# Patient Record
Sex: FEMALE | Race: WHITE | NOT HISPANIC OR LATINO | Employment: UNEMPLOYED | URBAN - METROPOLITAN AREA
[De-identification: names, ages, dates, MRNs, and addresses within clinical notes are randomized per-mention and may not be internally consistent; named-entity substitution may affect disease eponyms.]

---

## 2024-01-29 ENCOUNTER — EVALUATION (OUTPATIENT)
Dept: OCCUPATIONAL THERAPY | Facility: HOSPITAL | Age: 54
End: 2024-01-29
Payer: COMMERCIAL

## 2024-01-29 DIAGNOSIS — I89.0 LYMPHEDEMA, NOT ELSEWHERE CLASSIFIED: Primary | ICD-10-CM

## 2024-01-29 PROCEDURE — 97165 OT EVAL LOW COMPLEX 30 MIN: CPT | Mod: GO | Performed by: OCCUPATIONAL THERAPIST

## 2024-01-29 PROCEDURE — 97535 SELF CARE MNGMENT TRAINING: CPT | Mod: GO | Performed by: OCCUPATIONAL THERAPIST

## 2024-01-29 ASSESSMENT — PATIENT HEALTH QUESTIONNAIRE - PHQ9
8. MOVING OR SPEAKING SO SLOWLY THAT OTHER PEOPLE COULD HAVE NOTICED. OR THE OPPOSITE, BEING SO FIGETY OR RESTLESS THAT YOU HAVE BEEN MOVING AROUND A LOT MORE THAN USUAL: MORE THAN HALF THE DAYS
4. FEELING TIRED OR HAVING LITTLE ENERGY: MORE THAN HALF THE DAYS
10. IF YOU CHECKED OFF ANY PROBLEMS, HOW DIFFICULT HAVE THESE PROBLEMS MADE IT FOR YOU TO DO YOUR WORK, TAKE CARE OF THINGS AT HOME, OR GET ALONG WITH OTHER PEOPLE: SOMEWHAT DIFFICULT
9. THOUGHTS THAT YOU WOULD BE BETTER OFF DEAD, OR OF HURTING YOURSELF: NOT AT ALL
SUM OF ALL RESPONSES TO PHQ QUESTIONS 1-9: 15
SUM OF ALL RESPONSES TO PHQ9 QUESTIONS 1 AND 2: 3
2. FEELING DOWN, DEPRESSED OR HOPELESS: SEVERAL DAYS
5. POOR APPETITE OR OVEREATING: NEARLY EVERY DAY
3. TROUBLE FALLING OR STAYING ASLEEP OR SLEEPING TOO MUCH: MORE THAN HALF THE DAYS
7. TROUBLE CONCENTRATING ON THINGS, SUCH AS READING THE NEWSPAPER OR WATCHING TELEVISION: SEVERAL DAYS
1. LITTLE INTEREST OR PLEASURE IN DOING THINGS: MORE THAN HALF THE DAYS
6. FEELING BAD ABOUT YOURSELF - OR THAT YOU ARE A FAILURE OR HAVE LET YOURSELF OR YOUR FAMILY DOWN: MORE THAN HALF THE DAYS

## 2024-01-29 ASSESSMENT — ENCOUNTER SYMPTOMS
LOSS OF SENSATION IN FEET: 0
DEPRESSION: 1
OCCASIONAL FEELINGS OF UNSTEADINESS: 1

## 2024-01-29 ASSESSMENT — ACTIVITIES OF DAILY LIVING (ADL): HOME_MANAGEMENT_TIME_ENTRY: 20

## 2024-01-29 ASSESSMENT — PAIN - FUNCTIONAL ASSESSMENT: PAIN_FUNCTIONAL_ASSESSMENT: 0-10

## 2024-01-29 ASSESSMENT — PAIN SCALES - GENERAL: PAINLEVEL_OUTOF10: 5 - MODERATE PAIN

## 2024-01-29 NOTE — Clinical Note
January 29, 2024    Jennie Stack DO  [address]   [facility] [place] [address]    Patient: Kamilah Menjivar   YOB: 1970   Date of Visit: 1/29/2024       Dear Jennie Stack DO  [address]   [facility] [place] [address]    The attached plan of care is being sent to you because your patient’s medical reimbursement requires that you certify the plan of care. Your signature is required to allow uninterrupted insurance coverage.      You may indicate your approval by signing below and faxing this form back to us at Dept Fax: [phone].    Please call Dept: [phone] with any questions or concerns.    Thank you for this referral,        Bella Hines OT  [address] [id number] [phone]    Payer: Payor: [employer] / Plan: [facility] HEALTH PLAN / Product Type: *No Product type* /                                                                         Date:     Dear Bella Hines OT,     Re: Ms. Kamilah Menjivar, MRN:[id number]    I certify that I have reviewed the attached plan of care and it is medically necessary for Ms. Kamilah Menjivar (1970) who is under my care.          ______________________________________                    _________________  Provider name and credentials                                           Date and time                                                                                           Plan of Care 1/29/24   Effective from: 1/29/2024  Effective to: 4/29/2024    Plan ID: [id number]            Participants as of Finalize on 1/29/2024    Name Type Comments Contact Info    Jennie Stack DO PCP - General  [phone]    Bella Hines OT Occupational Therapist  [phone]       Last Plan Note     Author: Bella Hines OT Status: Signed Last edited: 1/29/2024  2:00 PM       Occupational  Therapy    Evaluation/Treatment    Patient Name: Kamilah Menjivar  MRN: [id number]  : 1970  Today's Date: 24  Visit number: 1      Time Calculation  Start Time: 1400  Stop Time: 1500  Time Calculation (min): 60 min    Assessment:   Pt has secondary lymphedema in VICTOR M LE , stage 2, phase 1 with moderate swelling.   Recommend inelastic wraps.  Pt has a pneumatic compression pump.   Plan:     Frequency: 1 x/wk  Duration: 12 weeks  Rehab Potential: Good  Plan of Care Agreement: Patient  Pt was provided with education on the lymphatic system, lymphedema and what treatment will include.  Education today touched on compression, exercises, skin care and MLD.  Pt is to elevate as able and increase activity, especially walking.  Compression was initiated.  Pt was fitted with double size G tg .  Pt was instructed on use, care, application and precautions of tg .     Pt has been in lymphedema care at a different location a couple years ago,   Treatment may include manual, self care, exercise, activity and aquatics.     Subjective   Current Problem:  1. Lymphedema, not elsewhere classified  Referral to Occupational Therapy    Follow Up In Occupational Therapy        General:   OT Received On: 24  General  Reason for Referral: LE lymphedema  Referred By: Jennie Stack    Pt states she has had swelling in her legs for a couple years.  She has had open wounds in the past.  States she has been treated in the past for lymphedema.  States she has a pneumatic compression pump at home.  She tries to use it everyday.  States she has compression knee highs but is not wearing today.  Pt feels the swelling has increased.  She live in a house with her elderly parents. States they are not able to help her.  She is sleeping in a bed. She states her legs are weak and she has decreased balance.   Precautions:   None  Pain:  Pain Assessment  Pain Assessment: 0-10  Pain Score: 5 - Moderate pain  Pain Location:  Knee  Pain Orientation: Right, Left      Objective   Prior Function: independent            Lymphedema Assessment     Right Lower Extremity:  R Metatarsal (cm): 21.5 cm  R Heel Y Angle: 29.5 cm  R Ankle (cm): 26.5 cm  R 10 cm Above Ankle (cm): 26 cm  R 20 cm Above Ankle (cm): 36 cm  R 30 cm Above Ankle (cm): 55 cm  R 40 cm Above Ankle (cm): 0 cm  R Knee (cm): 0 cm  R 10 cm Above Knee (cm): 0 cm  R 20 cm Above Knee (cm): 0 cm  R 30 cm Above Knee (cm): 0 cm  R 40 cm Above Knee (cm): 0 cm  R 50 cm Above Knee (cm): 0 cm  Right lower extremity total: 194.5  Left Lower Extremity:  L Metatarsal (cm): 20.5 cm  L Heel Y Angle: 29.5 cm  L Ankle (cm): 28 cm  L 10 cm Above Ankle (cm): 30.5 cm  L 20 cm Above Ankle (cm): 45 cm  L 30 cm Above Ankle (cm): 54 cm  L 40 cm Above Ankle (cm): 0 cm  L Knee (cm): 0 cm  L 10 cm Above Knee (cm): 0 cm  L 20 cm Above Knee (cm): 0 cm  L 30 cm Above Knee (cm): 0 cm  L 40 cm Above Knee (cm): 0 cm  L 50 cm Above Knee (cm): 0 cm  Left Lower extremity total: 207.5  LE Skin Appearance/Condition and Girth:  Dryness: yes  Edema - Pitting: yes  Hyperpigmentation: yes    Pt needed hand held assist to stand from the mat.  Dynamic standing balance is fair. Close supervision.      Pain Assessment:  Pain Assessment: 0-10  Pain Score: 5 - Moderate pain  Pain Location: Knee  Pain Orientation: Right, Left  Outcome Measures: LLIS score of 51 which is 75% impaired.   Fall risk, close supervision.  Pt states she is receiving mental health care.     OP EDUCATION:  Education  Individual(s) Educated: Patient  Education Provided: Diagnosis & Precautions, Edema control, Lymphedema, Symptom management, Risk and benefits of OT discussed with patient or other, POC discussed and agreed upon  Home Program: Edema control, Handout issued  Equipment: Tubigrip  Risk and Benefits Discussed with Patient/Caregiver/Other: yes  Patient/Caregiver Demonstrated Understanding: yes  Plan of Care Discussed and Agreed Upon: yes  Patient  Response to Education: Patient/Caregiver Verbalized Understanding of Information    Goals:  Active       OT Goals       OT Goal 1       Start:  01/29/24    Expected End:  04/29/24       STG: Pt will be independent with self management, including use of garments, pump if indicated, self MLD, ability to recognize signs of infection/cellulitis and exercise/HEP to minimize risk of progression of symptoms and skin infections/cellulitis.   LTG: Pt will show improvement on the LLIS impairment scale to no greater than 20% impaired.    STG: Decrease girth in bilateral LEs by 15 cm each for improved mobility and better clothing/shoe fit.    LTG: Increase VICTOR M leg strength to independent for sit to stand and good dynamic balance.             OT Problem       PATIENT STATED GOAL to decrease size of legs.        Start:  01/29/24    Expected End:  04/29/24       Pt goal is to decrease size of legs, increase balance and overall strength.                   Current Participants as of 1/29/2024    Name Type Comments Contact Info    Jennie Stack DO PCP - General  [phone]    Signature pending    Bella Hines OT Occupational Therapist  [phone]    Signature pending

## 2024-01-29 NOTE — PROGRESS NOTES
Occupational Therapy    Evaluation/Treatment    Patient Name: Kamilah Menjivar  MRN: [id number]  : 1970  Today's Date: 24  Visit number: 1      Time Calculation  Start Time: 1400  Stop Time: 1500  Time Calculation (min): 60 min    Assessment:   Pt has secondary lymphedema in VICTOR M LE , stage 2, phase 1 with moderate swelling.   Recommend inelastic wraps.  Pt has a pneumatic compression pump.   Plan:     Frequency: 1 x/wk  Duration: 12 weeks  Rehab Potential: Good  Plan of Care Agreement: Patient  Pt was provided with education on the lymphatic system, lymphedema and what treatment will include.  Education today touched on compression, exercises, skin care and MLD.  Pt is to elevate as able and increase activity, especially walking.  Compression was initiated.  Pt was fitted with double size G tg .  Pt was instructed on use, care, application and precautions of tg .     Pt has been in lymphedema care at a different location a couple years ago,   Treatment may include manual, self care, exercise, activity and aquatics.     Subjective   Current Problem:  1. Lymphedema, not elsewhere classified  Referral to Occupational Therapy    Follow Up In Occupational Therapy        General:   OT Received On: 24  General  Reason for Referral: LE lymphedema  Referred By: Jennie Stack    Pt states she has had swelling in her legs for a couple years.  She has had open wounds in the past.  States she has been treated in the past for lymphedema.  States she has a pneumatic compression pump at home.  She tries to use it everyday.  States she has compression knee highs but is not wearing today.  Pt feels the swelling has increased.  She live in a house with her elderly parents. States they are not able to help her.  She is sleeping in a bed. She states her legs are weak and she has decreased balance.   Precautions:   None  Pain:  Pain Assessment  Pain Assessment: 0-10  Pain Score: 5 - Moderate pain  Pain  Location: Knee  Pain Orientation: Right, Left      Objective   Prior Function: independent            Lymphedema Assessment     Right Lower Extremity:  R Metatarsal (cm): 21.5 cm  R Heel Y Angle: 29.5 cm  R Ankle (cm): 26.5 cm  R 10 cm Above Ankle (cm): 26 cm  R 20 cm Above Ankle (cm): 36 cm  R 30 cm Above Ankle (cm): 55 cm  R 40 cm Above Ankle (cm): 0 cm  R Knee (cm): 0 cm  R 10 cm Above Knee (cm): 0 cm  R 20 cm Above Knee (cm): 0 cm  R 30 cm Above Knee (cm): 0 cm  R 40 cm Above Knee (cm): 0 cm  R 50 cm Above Knee (cm): 0 cm  Right lower extremity total: 194.5  Left Lower Extremity:  L Metatarsal (cm): 20.5 cm  L Heel Y Angle: 29.5 cm  L Ankle (cm): 28 cm  L 10 cm Above Ankle (cm): 30.5 cm  L 20 cm Above Ankle (cm): 45 cm  L 30 cm Above Ankle (cm): 54 cm  L 40 cm Above Ankle (cm): 0 cm  L Knee (cm): 0 cm  L 10 cm Above Knee (cm): 0 cm  L 20 cm Above Knee (cm): 0 cm  L 30 cm Above Knee (cm): 0 cm  L 40 cm Above Knee (cm): 0 cm  L 50 cm Above Knee (cm): 0 cm  Left Lower extremity total: 207.5  LE Skin Appearance/Condition and Girth:  Dryness: yes  Edema - Pitting: yes  Hyperpigmentation: yes    Pt needed hand held assist to stand from the mat.  Dynamic standing balance is fair. Close supervision.      Pain Assessment:  Pain Assessment: 0-10  Pain Score: 5 - Moderate pain  Pain Location: Knee  Pain Orientation: Right, Left  Outcome Measures: LLIS score of 51 which is 75% impaired.   Fall risk, close supervision.  Pt states she is receiving mental health care.     OP EDUCATION:  Education  Individual(s) Educated: Patient  Education Provided: Diagnosis & Precautions, Edema control, Lymphedema, Symptom management, Risk and benefits of OT discussed with patient or other, POC discussed and agreed upon  Home Program: Edema control, Handout issued  Equipment: Tubigrip  Risk and Benefits Discussed with Patient/Caregiver/Other: yes  Patient/Caregiver Demonstrated Understanding: yes  Plan of Care Discussed and Agreed Upon:  yes  Patient Response to Education: Patient/Caregiver Verbalized Understanding of Information    Goals:  Active       OT Goals       OT Goal 1       Start:  01/29/24    Expected End:  04/29/24       STG: Pt will be independent with self management, including use of garments, pump if indicated, self MLD, ability to recognize signs of infection/cellulitis and exercise/HEP to minimize risk of progression of symptoms and skin infections/cellulitis.   LTG: Pt will show improvement on the LLIS impairment scale to no greater than 20% impaired.    STG: Decrease girth in bilateral LEs by 15 cm each for improved mobility and better clothing/shoe fit.    LTG: Increase VICTOR M leg strength to independent for sit to stand and good dynamic balance.             OT Problem       PATIENT STATED GOAL to decrease size of legs.        Start:  01/29/24    Expected End:  04/29/24       Pt goal is to decrease size of legs, increase balance and overall strength.

## 2024-02-07 ENCOUNTER — TREATMENT (OUTPATIENT)
Dept: OCCUPATIONAL THERAPY | Facility: HOSPITAL | Age: 54
End: 2024-02-07
Payer: COMMERCIAL

## 2024-02-07 DIAGNOSIS — I89.0 LYMPHEDEMA, NOT ELSEWHERE CLASSIFIED: ICD-10-CM

## 2024-02-07 PROCEDURE — 97535 SELF CARE MNGMENT TRAINING: CPT | Mod: GO | Performed by: OCCUPATIONAL THERAPIST

## 2024-02-07 ASSESSMENT — ACTIVITIES OF DAILY LIVING (ADL): HOME_MANAGEMENT_TIME_ENTRY: 54

## 2024-02-07 NOTE — PROGRESS NOTES
Occupational Therapy    Occupational Therapy Treatment    Name: Kamilah Menjivar  MRN: [id number]  : 1970  Date: 24  Time Calculation  Start Time: 820  Stop Time: 914  Time Calculation (min): 54 min  Visit number: 2  Pt was 20 minutes late.   Assessment:  Left decreased in girth, right increased.  Recommend inelastic wraps due to patient being in phase one of treatment, stage 2 with moderate swelling.   After trial with the inelastic wrap girth in the left leg decreased 4 cm.   Plan:  Check benefits for coverage for garments.       Subjective   General: Pt brought in custom knee high compression socks.  She states difficulty donning them.         Objective    Therapy/Activity:   SELF CARE: 54 min.  Educated pt on inelastic wraps vs compression knee highs.  Recommending wraps secondary to being in phase one of treatment.  Demonstrated a Sigvaris compreflex on pt.  Showed pt how to use a metal donning aide to don compression knee highs.  Pt has difficulty reaching.  Both compression knee highs were donned.  Discussed wearing these as able and wearing into treatment next time.  Pt stated after 10 minutes that they were not comfortable and wanted them off.  Socks were removed. Gave pt info on purchasing an inelastic wrap.  Pt states she would think about it.  Discussed for pt to purchase compression shorts.      Lymphedema Assessment       Right Lower Extremity:  R Metatarsal (cm): 21 cm  R Heel Y Angle: 28 cm  R Ankle (cm): 25 cm  R 10 cm Above Ankle (cm): 32.5 cm  R 20 cm Above Ankle (cm): 43.5 cm  R 30 cm Above Ankle (cm): 50 cm  R 40 cm Above Ankle (cm): 0 cm  R Knee (cm): 0 cm  R 10 cm Above Knee (cm): 0 cm  R 20 cm Above Knee (cm): 0 cm  R 30 cm Above Knee (cm): 0 cm  R 40 cm Above Knee (cm): 0 cm  R 50 cm Above Knee (cm): 0 cm  Right lower extremity total: 200  Left Lower Extremity:  L Metatarsal (cm): 20 cm  L Heel Y Angle: 28.5 cm  L Ankle (cm): 27 cm  L 10 cm Above Ankle (cm): 32 cm  L 20 cm  Above Ankle (cm): 43.5 cm  L 30 cm Above Ankle (cm): 50 cm  L 40 cm Above Ankle (cm): 0 cm  L Knee (cm): 0 cm  L 10 cm Above Knee (cm): 0 cm  L 20 cm Above Knee (cm): 0 cm  L 30 cm Above Knee (cm): 0 cm  L 40 cm Above Knee (cm): 0 cm  L 50 cm Above Knee (cm): 0 cm  Left Lower extremity total: 201  LE Skin Appearance/Condition and Girth:   Light redness on the right shin.    Pain Assessment:  No number given.  Pt stated discomfort.    Therapy Precautions:   none   OP EDUCATION: inelastic wraps, adaptive equipment       Goals:  Active       OT Goals       OT Goal 1       Start:  01/29/24    Expected End:  04/29/24       STG: Pt will be independent with self management, including use of garments, pump if indicated, self MLD, ability to recognize signs of infection/cellulitis and exercise/HEP to minimize risk of progression of symptoms and skin infections/cellulitis.   LTG: Pt will show improvement on the LLIS impairment scale to no greater than 20% impaired.    STG: Decrease girth in bilateral LEs by 15 cm each for improved mobility and better clothing/shoe fit.    LTG: Increase VICTOR M leg strength to independent for sit to stand and good dynamic balance.             OT Problem       PATIENT STATED GOAL to decrease size of legs.        Start:  01/29/24    Expected End:  04/29/24       Pt goal is to decrease size of legs, increase balance and overall strength.

## 2024-02-14 ENCOUNTER — TREATMENT (OUTPATIENT)
Dept: OCCUPATIONAL THERAPY | Facility: HOSPITAL | Age: 54
End: 2024-02-14
Payer: COMMERCIAL

## 2024-02-14 DIAGNOSIS — I89.0 LYMPHEDEMA, NOT ELSEWHERE CLASSIFIED: ICD-10-CM

## 2024-02-14 PROCEDURE — 97535 SELF CARE MNGMENT TRAINING: CPT | Mod: GO | Performed by: OCCUPATIONAL THERAPIST

## 2024-02-14 PROCEDURE — 97140 MANUAL THERAPY 1/> REGIONS: CPT | Mod: GO | Performed by: OCCUPATIONAL THERAPIST

## 2024-02-14 ASSESSMENT — ACTIVITIES OF DAILY LIVING (ADL): HOME_MANAGEMENT_TIME_ENTRY: 15

## 2024-02-14 NOTE — PROGRESS NOTES
Occupational Therapy    Occupational Therapy Treatment    Name: Kamilah Menjivar  MRN: [id number]  : 1970  Date: 24  Time Calculation  Start Time: 1420  Stop Time: 1500  Time Calculation (min): 40 min  Visit number: 3  Pt 20 min late.    Assessment:  Increase in girth right and left LEs.    Plan:  Pt to be consistent with double layer tg .        Subjective   General: Pt is wearing a single layer of tg .  States she does not know where the double layer given at eval is.          Objective    Therapy/Activity:   SELF CARE: 15 min Pt's insurance most likely covers compression stockings and not inelastic wraps.  This was discussed with the patient. Discussed medical grade compression vs inelastic wraps/medical grade therapeutic compression. Fitted pt with double sized F tg .  Encouraged pt to wear these in next time.  Pt states she would be willing to purchase an inelastic wrap.  Will discuss further next treatment.    Discussed use of the pool.  Pt indicated she has open wounds, unclear where.  Pt cannot be cleared for the pool with any wounds.   MANUAL:  25 min pt was provided with MLD for VICTOR M LEs.  Initiated self MLD.    Lymphedema Assessment       Right Lower Extremity:  R Metatarsal (cm): 21.5 cm  R Heel Y Angle: 28 cm  R Ankle (cm): 26 cm  R 10 cm Above Ankle (cm): 33 cm  R 20 cm Above Ankle (cm): 45 cm  R 30 cm Above Ankle (cm): 51.5 cm  R 40 cm Above Ankle (cm): 0 cm  R Knee (cm): 0 cm  R 10 cm Above Knee (cm): 0 cm  R 20 cm Above Knee (cm): 0 cm  R 30 cm Above Knee (cm): 0 cm  R 40 cm Above Knee (cm): 0 cm  R 50 cm Above Knee (cm): 0 cm  Right lower extremity total: 205  Left Lower Extremity:  L Metatarsal (cm): 20.5 cm  L Heel Y Angle: 30 cm  L Ankle (cm): 27.5 cm  L 10 cm Above Ankle (cm): 31.5 cm  L 20 cm Above Ankle (cm): 44.5 cm  L 30 cm Above Ankle (cm): 50 cm  L 40 cm Above Ankle (cm): 0 cm  L Knee (cm): 0 cm  L 10 cm Above Knee (cm): 0 cm  L 20 cm Above Knee (cm): 0 cm  L 30  cm Above Knee (cm): 0 cm  L 40 cm Above Knee (cm): 0 cm  L 50 cm Above Knee (cm): 0 cm  Left Lower extremity total: 204         Pain Assessment: none today.     Therapy Precautions:   none     OP EDUCATION: compression       Goals:  Active       OT Goals       OT Goal 1       Start:  01/29/24    Expected End:  04/29/24       STG: Pt will be independent with self management, including use of garments, pump if indicated, self MLD, ability to recognize signs of infection/cellulitis and exercise/HEP to minimize risk of progression of symptoms and skin infections/cellulitis.   LTG: Pt will show improvement on the LLIS impairment scale to no greater than 20% impaired.    STG: Decrease girth in bilateral LEs by 15 cm each for improved mobility and better clothing/shoe fit.    LTG: Increase VICTOR M leg strength to independent for sit to stand and good dynamic balance.             OT Problem       PATIENT STATED GOAL to decrease size of legs.        Start:  01/29/24    Expected End:  04/29/24       Pt goal is to decrease size of legs, increase balance and overall strength.

## 2024-02-21 ENCOUNTER — TREATMENT (OUTPATIENT)
Dept: OCCUPATIONAL THERAPY | Facility: HOSPITAL | Age: 54
End: 2024-02-21
Payer: COMMERCIAL

## 2024-02-21 DIAGNOSIS — I89.0 LYMPHEDEMA, NOT ELSEWHERE CLASSIFIED: Primary | ICD-10-CM

## 2024-02-21 PROCEDURE — 97535 SELF CARE MNGMENT TRAINING: CPT | Mod: GO | Performed by: OCCUPATIONAL THERAPIST

## 2024-02-21 PROCEDURE — 97140 MANUAL THERAPY 1/> REGIONS: CPT | Mod: GO | Performed by: OCCUPATIONAL THERAPIST

## 2024-02-21 ASSESSMENT — ACTIVITIES OF DAILY LIVING (ADL): HOME_MANAGEMENT_TIME_ENTRY: 29

## 2024-02-21 NOTE — PROGRESS NOTES
Occupational Therapy    Occupational Therapy Treatment    Name: Kamilah Menjivar  MRN: [id number]  : 1970  Date: 24  Time Calculation  Start Time: 1018  Stop Time: 1107  Time Calculation (min): 49 min  Visit number: 4    Assessment:  Some decrease in both legs.    Plan:  Fit with inelastic wrap when received.        Subjective   General: Difficult to pull size F tg  on upper calf.         Objective    Therapy/Activity:   SELF CARE: Tg  was rolled to mid-calf both legs. Gave pt a pair of size G tg .  Suggested pt wear size F on the lower leg and size G on the upper calf. Gave patient two sets of each.  Pt found the size G more comfortable.  Assisted pt to purchase one inelastic wrap.     Lymphedema Assessment       Right Lower Extremity:  R Metatarsal (cm): 21.5 cm  R Heel Y Angle: 28 cm  R Ankle (cm): 24.5 cm  R 10 cm Above Ankle (cm): 32.5 cm  R 20 cm Above Ankle (cm): 44 cm  R 30 cm Above Ankle (cm): 52 cm  R 40 cm Above Ankle (cm): 0 cm  R Knee (cm): 0 cm  R 10 cm Above Knee (cm): 0 cm  R 20 cm Above Knee (cm): 0 cm  R 30 cm Above Knee (cm): 0 cm  R 40 cm Above Knee (cm): 0 cm  R 50 cm Above Knee (cm): 0 cm  Right lower extremity total: 202.5  Left Lower Extremity:  L Metatarsal (cm): 22 cm  L Heel Y Angle: 29 cm  L Ankle (cm): 26.5 cm  L 10 cm Above Ankle (cm): 31 cm  L 20 cm Above Ankle (cm): 43.5 cm  L 30 cm Above Ankle (cm): 50 cm  L 40 cm Above Ankle (cm): 0 cm  L Knee (cm): 0 cm  L 10 cm Above Knee (cm): 0 cm  L 20 cm Above Knee (cm): 0 cm  L 30 cm Above Knee (cm): 0 cm  L 40 cm Above Knee (cm): 0 cm  L 50 cm Above Knee (cm): 0 cm  Left Lower extremity total: 202  LE Skin Appearance/Condition and Girth:   No issues    Pain Assessment: none reported     Therapy Precautions: none     OP EDUCATION: garments       Goals:  Active       OT Goals       OT Goal 1       Start:  24    Expected End:  24       STG: Pt will be independent with self management, including use of  garments, pump if indicated, self MLD, ability to recognize signs of infection/cellulitis and exercise/HEP to minimize risk of progression of symptoms and skin infections/cellulitis.   LTG: Pt will show improvement on the LLIS impairment scale to no greater than 20% impaired.    STG: Decrease girth in bilateral LEs by 15 cm each for improved mobility and better clothing/shoe fit.    LTG: Increase VICTORM  leg strength to independent for sit to stand and good dynamic balance.             OT Problem       PATIENT STATED GOAL to decrease size of legs.        Start:  01/29/24    Expected End:  04/29/24       Pt goal is to decrease size of legs, increase balance and overall strength.

## 2024-02-28 ENCOUNTER — APPOINTMENT (OUTPATIENT)
Dept: OCCUPATIONAL THERAPY | Facility: HOSPITAL | Age: 54
End: 2024-02-28
Payer: COMMERCIAL

## 2024-03-06 ENCOUNTER — TREATMENT (OUTPATIENT)
Dept: OCCUPATIONAL THERAPY | Facility: HOSPITAL | Age: 54
End: 2024-03-06
Payer: COMMERCIAL

## 2024-03-06 DIAGNOSIS — I89.0 LYMPHEDEMA, NOT ELSEWHERE CLASSIFIED: Primary | ICD-10-CM

## 2024-03-06 PROCEDURE — 97140 MANUAL THERAPY 1/> REGIONS: CPT | Mod: GO | Performed by: OCCUPATIONAL THERAPIST

## 2024-03-06 PROCEDURE — 97535 SELF CARE MNGMENT TRAINING: CPT | Mod: GO | Performed by: OCCUPATIONAL THERAPIST

## 2024-03-06 ASSESSMENT — ACTIVITIES OF DAILY LIVING (ADL): HOME_MANAGEMENT_TIME_ENTRY: 30

## 2024-03-06 NOTE — PROGRESS NOTES
Occupational Therapy    Occupational Therapy Treatment    Name: Kamilah Menjivar  MRN: [id number]  : 1970  Date: 24   Pt arrived 25 min late.                       Visit number: 5    Assessment:   Slight decrease in girth.  Plan:  Continue.         Subjective   General: No new info.        Objective    Therapy/Activity:   SELF CARE: 30 min Discussed importance of consistence use of medical grade therapeutic compression.   MANUAL: 30 min Provided modified manual lymph drainage for VICTOR M LEs.  Reviewed self MLD.     Lymphedema Assessment       Right Lower Extremity:  R Metatarsal (cm): 20.3 cm  R Heel Y Angle: 28 cm  R Ankle (cm): 24.5 cm  R 10 cm Above Ankle (cm): 33 cm  R 20 cm Above Ankle (cm): 43.5 cm  R 30 cm Above Ankle (cm): 51 cm  R 40 cm Above Ankle (cm): 0 cm  R Knee (cm): 0 cm  R 10 cm Above Knee (cm): 0 cm  R 20 cm Above Knee (cm): 0 cm  R 30 cm Above Knee (cm): 0 cm  R 40 cm Above Knee (cm): 0 cm  R 50 cm Above Knee (cm): 0 cm  Right lower extremity total: 200.3  Right above knee 57  Mid thigh 68  Left Lower Extremity:  L Metatarsal (cm): 20 cm  L Heel Y Angle: 28.5 cm  L Ankle (cm): 26.5 cm  L 10 cm Above Ankle (cm): 31 cm  L 20 cm Above Ankle (cm): 43 cm  L 30 cm Above Ankle (cm): 49.5 cm  L 40 cm Above Ankle (cm): 0 cm  L Knee (cm): 0 cm  L 10 cm Above Knee (cm): 0 cm  L 20 cm Above Knee (cm): 0 cm  L 30 cm Above Knee (cm): 0 cm  L 40 cm Above Knee (cm): 0 cm  L 50 cm Above Knee (cm): 0 cm  Left Lower extremity total: 198.5  Just above knee 61 cm   Mid thigh 70 cm     LE Skin Appearance/Condition and Girth:  Swelling noted in abdomen, bigger on left side  Lobule on inner left leg, medial side above knee    Pain Assessment: none reported.     Therapy Precautions: none      OP EDUCATION: use of compression       Goals:  Active       OT Goals       OT Goal 1       Start:  24    Expected End:  24       STG: Pt will be independent with self management, including use of garments,  pump if indicated, self MLD, ability to recognize signs of infection/cellulitis and exercise/HEP to minimize risk of progression of symptoms and skin infections/cellulitis.   LTG: Pt will show improvement on the LLIS impairment scale to no greater than 20% impaired.    STG: Decrease girth in bilateral LEs by 15 cm each for improved mobility and better clothing/shoe fit.    LTG: Increase VICTOR M leg strength to independent for sit to stand and good dynamic balance.             OT Problem       PATIENT STATED GOAL to decrease size of legs.        Start:  01/29/24    Expected End:  04/29/24       Pt goal is to decrease size of legs, increase balance and overall strength.

## 2024-03-11 ENCOUNTER — TREATMENT (OUTPATIENT)
Dept: OCCUPATIONAL THERAPY | Facility: HOSPITAL | Age: 54
End: 2024-03-11
Payer: COMMERCIAL

## 2024-03-11 DIAGNOSIS — I89.0 LYMPHEDEMA, NOT ELSEWHERE CLASSIFIED: ICD-10-CM

## 2024-03-11 PROCEDURE — 97110 THERAPEUTIC EXERCISES: CPT | Mod: GO,CO

## 2024-03-11 PROCEDURE — 97140 MANUAL THERAPY 1/> REGIONS: CPT | Mod: GO,CO

## 2024-03-11 PROCEDURE — 97535 SELF CARE MNGMENT TRAINING: CPT | Mod: GO,CO

## 2024-03-11 ASSESSMENT — ACTIVITIES OF DAILY LIVING (ADL): HOME_MANAGEMENT_TIME_ENTRY: 15

## 2024-03-11 ASSESSMENT — PAIN SCALES - GENERAL: PAINLEVEL_OUTOF10: 1

## 2024-03-11 ASSESSMENT — PAIN - FUNCTIONAL ASSESSMENT: PAIN_FUNCTIONAL_ASSESSMENT: 0-10

## 2024-03-11 NOTE — PROGRESS NOTES
"Occupational Therapy    Occupational Therapy Treatment    Name: Kamilah Menjivar  MRN: [id number]  : 1970  Date: 24  Time Calculation  Start Time: 1515  Stop Time: 1600  Time Calculation (min): 45 min     OT Therapeutic Procedures Time Entry  Manual Therapy Time Entry: 15  Self Care/Home Management (ADLs) Time Entry: 15  Therapeutic Exercise Time Entry: 15    Visit number: 6    Assessment:  Pts BLE edema increased. Questioned pt on her activity level and if she kept her BLEs elevated when she is sitting. Pt has a low stool but it doesnt raise her legs  to heart level. Encouraged pt to place pillows on top of her stool. Pt educated in lymph system and rational for the approaches of elevation, compression and exercise. Pt verbalized understanding. Encouraged more activity throughout the day, more walking.   Plan:     Cont skilled OT to decrease edema and pain improve AROM and strength to perform ADL and IADLs with greater efficiency    Subjective \"today is a good day, really no pain.           Objective    Therapy/Activity:   SELF CARE: education in the lymphatic system and the peripheral artery vein system and how they are intertwined.   MANUAL: MLD anterior approach, clavicular notches, axilla, abdominal series, inguinals and Les. Reviewed self MLD with pt.   THERAPEUTIC EXERCISE: Initiated LE lymphedema reduction exs. Educated pt on the rational and improtance of exs. Pt has some diff with performance.   Lymphedema Assessment       Right Lower Extremity:  R Metatarsal (cm): 22.2 cm  R Heel Y Angle: 30.3 cm  R Ankle (cm): 28 cm  R 10 cm Above Ankle (cm): 31 cm  R 20 cm Above Ankle (cm): 45.1 cm  R 30 cm Above Ankle (cm): 53.9 cm  R 40 cm Above Ankle (cm): 0 cm  R Knee (cm): 0 cm  R 10 cm Above Knee (cm): 0 cm  R 20 cm Above Knee (cm): 0 cm  R 30 cm Above Knee (cm): 0 cm  R 40 cm Above Knee (cm): 0 cm  R 50 cm Above Knee (cm): 0 cm  Right lower extremity total: 210.5  Left Lower Extremity:  L " Metatarsal (cm): 21.5 cm  L Heel Y Angle: 30.2 cm  L Ankle (cm): 29 cm  L 10 cm Above Ankle (cm): 32.1 cm  L 20 cm Above Ankle (cm): 46.5 cm  L 30 cm Above Ankle (cm): 53.5 cm  L 40 cm Above Ankle (cm): 0 cm  L Knee (cm): 0 cm  L 10 cm Above Knee (cm): 0 cm  L 20 cm Above Knee (cm): 0 cm  L 30 cm Above Knee (cm): 0 cm  L 40 cm Above Knee (cm): 0 cm  L 50 cm Above Knee (cm): 0 cm  Left Lower extremity total: 212.8  LE Skin Appearance/Condition and Girth:  Dryness: yes  Edema - Pitting: yes  Hyperkeratosis: yes  Hyperpigmentation: yes  +Stemmer Sign: yes    Pain Assessment:  Pain Assessment: 0-10  Pain Score: 1  Pain Location: Knee  Pain Orientation:  (righ left)  Therapy Precautions: Recommended Pt use a long handled shoe horn as she has broken down the back of her shoes which can irritate her skin, making skin susceptible to break down.       OP EDUCATION: Inst pt in LE lymphedema exs. Toe curls, ankle pumps and circles, int ext rotation, knee extension and marching. mStressed to Pt that she must hold each ex for 3-5 sec. Pt returned demo correctly.       Goals:  Active       OT Goals       OT Goal 1 (Progressing)       Start:  01/29/24    Expected End:  04/29/24       STG: Pt will be independent with self management, including use of garments, pump if indicated, self MLD, ability to recognize signs of infection/cellulitis and exercise/HEP to minimize risk of progression of symptoms and skin infections/cellulitis.   LTG: Pt will show improvement on the LLIS impairment scale to no greater than 20% impaired.    STG: Decrease girth in bilateral LEs by 15 cm each for improved mobility and better clothing/shoe fit.    LTG: Increase VICTOR M leg strength to independent for sit to stand and good dynamic balance.             OT Problem       PATIENT STATED GOAL to decrease size of legs.  (Progressing)       Start:  01/29/24    Expected End:  04/29/24       Pt goal is to decrease size of legs, increase balance and overall  strength.

## 2024-03-18 ENCOUNTER — APPOINTMENT (OUTPATIENT)
Dept: OCCUPATIONAL THERAPY | Facility: HOSPITAL | Age: 54
End: 2024-03-18
Payer: COMMERCIAL

## 2024-03-25 ENCOUNTER — TREATMENT (OUTPATIENT)
Dept: OCCUPATIONAL THERAPY | Facility: HOSPITAL | Age: 54
End: 2024-03-25
Payer: COMMERCIAL

## 2024-03-25 DIAGNOSIS — I89.0 LYMPHEDEMA, NOT ELSEWHERE CLASSIFIED: ICD-10-CM

## 2024-03-25 PROCEDURE — 97140 MANUAL THERAPY 1/> REGIONS: CPT | Mod: GP

## 2024-03-25 PROCEDURE — 97110 THERAPEUTIC EXERCISES: CPT | Mod: GP

## 2024-03-25 PROCEDURE — 97535 SELF CARE MNGMENT TRAINING: CPT | Mod: GP

## 2024-03-25 ASSESSMENT — ACTIVITIES OF DAILY LIVING (ADL): HOME_MANAGEMENT_TIME_ENTRY: 15

## 2024-03-25 ASSESSMENT — PAIN SCALES - GENERAL: PAINLEVEL_OUTOF10: 1

## 2024-03-25 ASSESSMENT — PAIN - FUNCTIONAL ASSESSMENT: PAIN_FUNCTIONAL_ASSESSMENT: 0-10

## 2024-03-25 NOTE — PROGRESS NOTES
Occupational Therapy    Occupational Therapy Treatment    Name: Kamilah Menjivar  MRN: [id number]  : 1970  Date: 24             Visit number: 7    Assessment:   BLEs decreased in measurements. LLE by 9.7cm and RLE by 8.1cm. Pt is still awaiting the delivery of the vaso pump.   Plan:  Pt scheduled out 2 weeks for follow up to monitor HEP and for pump to be delivered and Pt to begin use.        Subjective Pt came in wearing her BLE inelastic wraps. Pt feels tht her edema is decreasing.          Objective    Therapy/Activity:   SELF CARE: Review of proper wear and care with wraps. Inst pt in not allowing straps to overlap. Inst pt to only tighten to 2 fingers underneath. Pt verbalized and demonstrated understanding.   MANUAL: Review of self MLD and MLD anterior approach clavicular notches, axilla, abdominal series and inguinals. BLE MLD.  THERAPEUTIC EXERCISE: Pt performed her exs toe curls ankle pumps and circles, int ext rotation knee bends and ext and marches X 10 reps each. Inst pt to perform this more often throughout the day.  Lymphedema Assessment       Right Lower Extremity:  R Metatarsal (cm): 20.3 cm  R Heel Y Angle: 27.8 cm  R Ankle (cm): 25.3 cm  R 10 cm Above Ankle (cm): 33.2 cm  R 20 cm Above Ankle (cm): 45.4 cm  R 30 cm Above Ankle (cm): 50.4 cm  R 40 cm Above Ankle (cm): 0 cm  R Knee (cm): 0 cm  R 10 cm Above Knee (cm): 0 cm  R 20 cm Above Knee (cm): 0 cm  R 30 cm Above Knee (cm): 0 cm  R 40 cm Above Knee (cm): 0 cm  R 50 cm Above Knee (cm): 0 cm  Right lower extremity total: 202.4  Left Lower Extremity:  L Metatarsal (cm): 19.9 cm  L Heel Y Angle: 29.3 cm  L Ankle (cm): 27 cm  L 10 cm Above Ankle (cm): 30.3 cm  L 20 cm Above Ankle (cm): 44.6 cm  L 30 cm Above Ankle (cm): 52 cm  L 40 cm Above Ankle (cm): 0 cm  L Knee (cm): 0 cm  L 10 cm Above Knee (cm): 0 cm  L 20 cm Above Knee (cm): 0 cm  L 30 cm Above Knee (cm): 0 cm  L 40 cm Above Knee (cm): 0 cm  L 50 cm Above Knee (cm): 0 cm  Left  Lower extremity total: 203.1  LE Skin Appearance/Condition and Girth:  Dryness: yes  Edema - Pitting: yes  Hyperkeratosis: yes  Hyperpigmentation: yes  +Stemmer Sign: yes    Pain Assessment:  Pain Assessment: 0-10  Pain Score: 1  Pain Location: Knee  Therapy Precautions:   Precautions Comment: Falls precautions      OP EDUCATION: Inst pt to cont with wearing inelastic wraps. Inst pt that she could wear TG  underneath the inelastic wraps for increased compression. Pt states that she prefers to wear the socks.        Goals:  Active       OT Goals       OT Goal 1 (Progressing)       Start:  01/29/24    Expected End:  04/29/24       STG: Pt will be independent with self management, including use of garments, pump if indicated, self MLD, ability to recognize signs of infection/cellulitis and exercise/HEP to minimize risk of progression of symptoms and skin infections/cellulitis.   LTG: Pt will show improvement on the LLIS impairment scale to no greater than 20% impaired.    STG: Decrease girth in bilateral LEs by 15 cm each for improved mobility and better clothing/shoe fit.    LTG: Increase VICTOR M leg strength to independent for sit to stand and good dynamic balance.             OT Problem       PATIENT STATED GOAL to decrease size of legs.  (Progressing)       Start:  01/29/24    Expected End:  04/29/24       Pt goal is to decrease size of legs, increase balance and overall strength.

## 2024-04-01 ENCOUNTER — TREATMENT (OUTPATIENT)
Dept: OCCUPATIONAL THERAPY | Facility: HOSPITAL | Age: 54
End: 2024-04-01
Payer: COMMERCIAL

## 2024-04-01 DIAGNOSIS — I89.0 LYMPHEDEMA, NOT ELSEWHERE CLASSIFIED: ICD-10-CM

## 2024-04-01 PROCEDURE — 97110 THERAPEUTIC EXERCISES: CPT | Mod: GP

## 2024-04-01 PROCEDURE — 97110 THERAPEUTIC EXERCISES: CPT | Mod: GO,CO

## 2024-04-01 ASSESSMENT — PAIN - FUNCTIONAL ASSESSMENT: PAIN_FUNCTIONAL_ASSESSMENT: 0-10

## 2024-04-01 ASSESSMENT — PAIN SCALES - GENERAL: PAINLEVEL_OUTOF10: 1

## 2024-04-01 NOTE — PROGRESS NOTES
Occupational Therapy    Occupational Therapy Treatment    Name: Kamilah Menjivar  MRN: [id number]  : 1970  Date: 24  Time Calculation  Start Time: 1430  Stop Time: 1500  Time Calculation (min): 30 min     OT Therapeutic Procedures Time Entry  Therapeutic Exercise Time Entry: 30             Visit number: 8    Assessment:  Pt encouraged to perform her exs and wear her wraps consistently in order to decrease her lymphedema and improve her ease in ADLs transfers and mobility.   Plan:     Pt decreased to every other week for therapy awaiting arrival of her pump.     Subjective Pt arrived for her appt. 30 min late and without her inelastic wraps donned. Pt states she forgot them because she got up late.  Discussed custom garments for abdominal and upper thigh lymphedema. Pt waivered with out a real answer,  but was leaning towards capris.          Objective    Therapy/Activity:   SELF CARE:   MANUAL: MLD to BLEs   THERAPEUTIC EXERCISE: pt performed her HEP for her feet. Pt doesn't really perfrom the other exs. New exs added to HEP in standing to assist with decreasing edema.   Lymphedema Assessment       Right Lower Extremity:  R Metatarsal (cm): 20.7 cm  R Heel Y Angle: 29 cm  R Ankle (cm): 26 cm  R 10 cm Above Ankle (cm): 37.5 cm  R 20 cm Above Ankle (cm): 49 cm  R 30 cm Above Ankle (cm): 54 cm  R 40 cm Above Ankle (cm): 0 cm  R Knee (cm): 49.7 cm  R 10 cm Above Knee (cm): 0 cm  R 20 cm Above Knee (cm): 0 cm  R 30 cm Above Knee (cm): 0 cm  R 40 cm Above Knee (cm): 0 cm  R 50 cm Above Knee (cm): 0 cm  Right lower extremity total: 265.9  Left Lower Extremity:  L Metatarsal (cm): 20.5 cm  L Heel Y Angle: 29.5 cm  L Ankle (cm): 28.2 cm  L 10 cm Above Ankle (cm): 34.5 cm  L 20 cm Above Ankle (cm): 49.8 cm  L 30 cm Above Ankle (cm): 54.1 cm  L 40 cm Above Ankle (cm): 0 cm  L Knee (cm): 51.8 cm  L 10 cm Above Knee (cm): 0 cm  L 20 cm Above Knee (cm): 0 cm  L 30 cm Above Knee (cm): 0 cm  L 40 cm Above Knee (cm):  0 cm  L 50 cm Above Knee (cm): 0 cm  Left Lower extremity total: 268.4  LE Skin Appearance/Condition and Girth:  Dryness: yes  Edema - Pitting: yes  Hyperkeratosis: yes  Hyperpigmentation: yes  +Stemmer Sign: yes    Pain Assessment:  Pain Assessment: 0-10  Pain Score: 1  Pain Location: Knee  Therapy Precautions:   Medical Precautions: Lymphedema precautions      OP EDUCATION:   New exs added to HEP in standing toe raises, leg abduction extension and flexion x 10 reps each.     Goals:  Active       OT Goals       OT Goal 1 (Progressing)       Start:  01/29/24    Expected End:  04/29/24       STG: Pt will be independent with self management, including use of garments, pump if indicated, self MLD, ability to recognize signs of infection/cellulitis and exercise/HEP to minimize risk of progression of symptoms and skin infections/cellulitis.   LTG: Pt will show improvement on the LLIS impairment scale to no greater than 20% impaired.    STG: Decrease girth in bilateral LEs by 15 cm each for improved mobility and better clothing/shoe fit.    LTG: Increase VICTOR M leg strength to independent for sit to stand and good dynamic balance.             OT Problem       PATIENT STATED GOAL to decrease size of legs.  (Progressing)       Start:  01/29/24    Expected End:  04/29/24       Pt goal is to decrease size of legs, increase balance and overall strength.

## 2024-04-24 ENCOUNTER — TREATMENT (OUTPATIENT)
Dept: OCCUPATIONAL THERAPY | Facility: HOSPITAL | Age: 54
End: 2024-04-24
Payer: COMMERCIAL

## 2024-04-24 DIAGNOSIS — I89.0 LYMPHEDEMA, NOT ELSEWHERE CLASSIFIED: ICD-10-CM

## 2024-04-24 PROCEDURE — 97140 MANUAL THERAPY 1/> REGIONS: CPT | Mod: GO,CO

## 2024-04-24 PROCEDURE — 97535 SELF CARE MNGMENT TRAINING: CPT | Mod: GO,CO

## 2024-04-24 ASSESSMENT — ACTIVITIES OF DAILY LIVING (ADL): HOME_MANAGEMENT_TIME_ENTRY: 40

## 2024-04-24 ASSESSMENT — PAIN - FUNCTIONAL ASSESSMENT: PAIN_FUNCTIONAL_ASSESSMENT: 0-10

## 2024-04-24 ASSESSMENT — PAIN SCALES - GENERAL: PAINLEVEL_OUTOF10: 2

## 2024-04-24 NOTE — PROGRESS NOTES
"Occupational Therapy    Occupational Therapy Treatment    Name: Kamilah Menjivar  MRN: [id number]  : 1970  Date: 24            Occupational Therapy Treatment    Name: Kamilah Menjivar  MRN: [id number]  : 1970  Date: 24                Visit number: 9    Assessment:  Pts measurements have had a decrease in RLE. LLE got bigger.  Pt admits to not doing her MLD and HEP as often as she should.  Wants cher compression pants. Informed Pt that she will have to find out her coverage. Email to [employer] as pt is wanting the trunk piece to her vaso pneumatic pump. Inst Pt that she will be put on hold.  Plan:     Pt to be on HOLD until she determines her coverage for compressiomn cher pants and if there is a preferred provider by her insurance. Told pt that she can call for assistance and questions if needed to procur the pants. Pt verbalized understaning.    Subjective \" another crazy diff day.\"  Pt arrived 38 min late for her appt. Pt seen for 45 min.  General:  Pt has a moist reddened odiferous are in her  R posterior knee fold. Pt thinks she has nystatin powder. Recommended that she use it behind her knee.. Inst pt to be more thorough with skin care and drying in these areas.        Objective    Therapy/Activity:   SELF CARE: Measurements for cher pants and shorts for correct size. Inst in hygiene for behind her right knee to prevent yeast infection which could lead to cellulitis. Pt verbalized understanding.   MANUAL: reviewed MLD  THERAPEUTIC EXERCISE:   Lymphedema Assessment       Right Lower Extremity:  R Metatarsal (cm): 21.8 cm  R Heel Y Angle: 29 cm  R Ankle (cm): 25.7 cm  R 10 cm Above Ankle (cm): 34 cm  R 20 cm Above Ankle (cm): 44.5 cm  R 30 cm Above Ankle (cm): 56 cm  R 40 cm Above Ankle (cm): 0 cm  R Knee (cm): 51.4 cm  R 10 cm Above Knee (cm): 0 cm  R 20 cm Above Knee (cm): 0 cm  R 30 cm Above Knee (cm): 0 cm  R 40 cm Above Knee (cm): 0 cm  R 50 cm Above Knee (cm): 0 cm  Right lower " extremity total: 262.4  Left Lower Extremity:  L Metatarsal (cm): 22.2 cm  L Heel Y Angle: 34.4 cm  L Ankle (cm): 27.8 cm  L 10 cm Above Ankle (cm): 34.8 cm  L 20 cm Above Ankle (cm): 47.5 cm  L 30 cm Above Ankle (cm): 54.5 cm  L 40 cm Above Ankle (cm): 0 cm  L Knee (cm): 52 cm  L 10 cm Above Knee (cm): 0 cm  L 20 cm Above Knee (cm): 0 cm  L 30 cm Above Knee (cm): 0 cm  L 40 cm Above Knee (cm): 0 cm  L 50 cm Above Knee (cm): 0 cm  Left Lower extremity total: 273.2  LE Skin Appearance/Condition and Girth:  Dryness: yes  Edema - Pitting: yes  Hyperkeratosis: yes  Hyperpigmentation: yes  +Stemmer Sign: yes    Pain Assessment:  Pain Assessment: 0-10  Pain Score: 2  Pain Location: Knee  Therapy Precautions:   Medical Precautions: Lymphedema precautions      OP EDUCATION:   Inst pt to call and get insurance info and approval from her insurance company. Pt given 2 options sigvaris compreshorts or  wear ease with compression capris. Pt measured for correct sizes and pt given info for each company.     Goals:  Active       OT Goals       OT Goal 1 (Progressing)       Start:  01/29/24    Expected End:  04/29/24       STG: Pt will be independent with self management, including use of garments, pump if indicated, self MLD, ability to recognize signs of infection/cellulitis and exercise/HEP to minimize risk of progression of symptoms and skin infections/cellulitis.   LTG: Pt will show improvement on the LLIS impairment scale to no greater than 20% impaired.    STG: Decrease girth in bilateral LEs by 15 cm each for improved mobility and better clothing/shoe fit.    LTG: Increase VICTOR M leg strength to independent for sit to stand and good dynamic balance.             OT Problem       PATIENT STATED GOAL to decrease size of legs.  (Progressing)       Start:  01/29/24    Expected End:  04/29/24       Pt goal is to decrease size of legs, increase balance and overall strength.